# Patient Record
Sex: FEMALE | Race: WHITE | NOT HISPANIC OR LATINO | Employment: FULL TIME | URBAN - METROPOLITAN AREA
[De-identification: names, ages, dates, MRNs, and addresses within clinical notes are randomized per-mention and may not be internally consistent; named-entity substitution may affect disease eponyms.]

---

## 2017-07-27 ENCOUNTER — TRANSCRIBE ORDERS (OUTPATIENT)
Dept: ADMINISTRATIVE | Facility: HOSPITAL | Age: 47
End: 2017-07-27

## 2017-07-27 DIAGNOSIS — Z12.31 VISIT FOR SCREENING MAMMOGRAM: Primary | ICD-10-CM

## 2017-08-24 ENCOUNTER — HOSPITAL ENCOUNTER (OUTPATIENT)
Dept: MAMMOGRAPHY | Facility: HOSPITAL | Age: 47
Discharge: HOME | End: 2017-08-24
Attending: OBSTETRICS & GYNECOLOGY | Admitting: OBSTETRICS & GYNECOLOGY

## 2017-08-24 DIAGNOSIS — Z12.31 VISIT FOR SCREENING MAMMOGRAM: ICD-10-CM

## 2017-08-24 PROCEDURE — 77067 SCR MAMMO BI INCL CAD: CPT | Performed by: RADIOLOGY

## 2017-08-24 PROCEDURE — G0202 SCR MAMMO BI INCL CAD: HCPCS

## 2017-08-24 PROCEDURE — 77063 BREAST TOMOSYNTHESIS BI: CPT

## 2017-08-24 PROCEDURE — 77063 BREAST TOMOSYNTHESIS BI: CPT | Performed by: RADIOLOGY

## 2017-09-01 ENCOUNTER — HOSPITAL ENCOUNTER (OUTPATIENT)
Dept: MAMMOGRAPHY | Facility: HOSPITAL | Age: 47
Discharge: HOME | End: 2017-09-01

## 2017-09-01 ENCOUNTER — HOSPITAL ENCOUNTER (OUTPATIENT)
Dept: ULTRASOUND IMAGING | Facility: HOSPITAL | Age: 47
Discharge: HOME | End: 2017-09-01
Admitting: OBSTETRICS & GYNECOLOGY

## 2017-09-01 DIAGNOSIS — R92.8 ABNORMAL MAMMOGRAM: ICD-10-CM

## 2017-09-01 PROCEDURE — G0202 SCR MAMMO BI INCL CAD: HCPCS

## 2017-09-01 PROCEDURE — 76642 ULTRASOUND BREAST LIMITED: CPT | Performed by: RADIOLOGY

## 2017-09-01 PROCEDURE — 76642 ULTRASOUND BREAST LIMITED: CPT

## 2018-08-15 ENCOUNTER — TRANSCRIBE ORDERS (OUTPATIENT)
Dept: ADMINISTRATIVE | Facility: HOSPITAL | Age: 48
End: 2018-08-15

## 2018-08-15 DIAGNOSIS — Z12.31 VISIT FOR SCREENING MAMMOGRAM: Primary | ICD-10-CM

## 2018-08-30 ENCOUNTER — HOSPITAL ENCOUNTER (OUTPATIENT)
Dept: MAMMOGRAPHY | Facility: HOSPITAL | Age: 48
Discharge: HOME | End: 2018-08-30
Attending: OBSTETRICS & GYNECOLOGY | Admitting: OBSTETRICS & GYNECOLOGY

## 2018-08-30 DIAGNOSIS — Z12.31 VISIT FOR SCREENING MAMMOGRAM: ICD-10-CM

## 2018-08-30 PROCEDURE — 77067 SCR MAMMO BI INCL CAD: CPT

## 2018-08-30 PROCEDURE — 77067 SCR MAMMO BI INCL CAD: CPT | Performed by: RADIOLOGY

## 2018-08-30 PROCEDURE — 77063 BREAST TOMOSYNTHESIS BI: CPT | Performed by: RADIOLOGY

## 2018-08-30 PROCEDURE — 77063 BREAST TOMOSYNTHESIS BI: CPT

## 2019-07-26 ENCOUNTER — TRANSCRIBE ORDERS (OUTPATIENT)
Dept: ADMINISTRATIVE | Facility: HOSPITAL | Age: 49
End: 2019-07-26

## 2019-07-26 DIAGNOSIS — N63.14 BREAST LUMP ON RIGHT SIDE AT 4 O'CLOCK POSITION: Primary | ICD-10-CM

## 2019-09-03 ENCOUNTER — HOSPITAL ENCOUNTER (OUTPATIENT)
Dept: MAMMOGRAPHY | Facility: HOSPITAL | Age: 49
Discharge: HOME | End: 2019-09-03
Admitting: ADVANCED PRACTICE MIDWIFE

## 2019-09-03 ENCOUNTER — HOSPITAL ENCOUNTER (OUTPATIENT)
Dept: ULTRASOUND IMAGING | Facility: HOSPITAL | Age: 49
Discharge: HOME | End: 2019-09-03

## 2019-09-03 DIAGNOSIS — N63.14 BREAST LUMP ON RIGHT SIDE AT 4 O'CLOCK POSITION: ICD-10-CM

## 2019-09-03 PROCEDURE — G0279 TOMOSYNTHESIS, MAMMO: HCPCS

## 2019-09-03 PROCEDURE — 76642 ULTRASOUND BREAST LIMITED: CPT | Performed by: RADIOLOGY

## 2019-09-03 PROCEDURE — 76642 ULTRASOUND BREAST LIMITED: CPT

## 2019-09-03 PROCEDURE — 77062 BREAST TOMOSYNTHESIS BI: CPT | Performed by: RADIOLOGY

## 2019-09-03 PROCEDURE — 77066 DX MAMMO INCL CAD BI: CPT

## 2019-09-03 PROCEDURE — 77066 DX MAMMO INCL CAD BI: CPT | Performed by: RADIOLOGY

## 2020-07-27 ENCOUNTER — TRANSCRIBE ORDERS (OUTPATIENT)
Dept: ADMINISTRATIVE | Facility: HOSPITAL | Age: 50
End: 2020-07-27

## 2020-07-27 DIAGNOSIS — Z12.31 VISIT FOR SCREENING MAMMOGRAM: Primary | ICD-10-CM

## 2020-11-11 ENCOUNTER — HOSPITAL ENCOUNTER (OUTPATIENT)
Dept: MAMMOGRAPHY | Facility: HOSPITAL | Age: 50
Discharge: HOME | End: 2020-11-11
Admitting: ADVANCED PRACTICE MIDWIFE

## 2020-11-11 DIAGNOSIS — Z12.31 VISIT FOR SCREENING MAMMOGRAM: ICD-10-CM

## 2020-11-11 PROCEDURE — 77063 BREAST TOMOSYNTHESIS BI: CPT | Performed by: RADIOLOGY

## 2020-11-11 PROCEDURE — 77067 SCR MAMMO BI INCL CAD: CPT | Performed by: RADIOLOGY

## 2020-11-11 PROCEDURE — 77063 BREAST TOMOSYNTHESIS BI: CPT

## 2020-11-11 PROCEDURE — 77067 SCR MAMMO BI INCL CAD: CPT

## 2020-11-24 ENCOUNTER — OFFICE VISIT (OUTPATIENT)
Dept: ENDOCRINOLOGY | Facility: CLINIC | Age: 50
End: 2020-11-24

## 2020-11-24 ENCOUNTER — LAB REQUISITION (OUTPATIENT)
Dept: LAB | Facility: HOSPITAL | Age: 50
End: 2020-11-24

## 2020-11-24 VITALS
TEMPERATURE: 97.3 F | HEART RATE: 70 BPM | HEIGHT: 68 IN | SYSTOLIC BLOOD PRESSURE: 116 MMHG | WEIGHT: 145.8 LBS | DIASTOLIC BLOOD PRESSURE: 60 MMHG | OXYGEN SATURATION: 98 % | BODY MASS INDEX: 22.1 KG/M2

## 2020-11-24 DIAGNOSIS — E89.0 POSTSURGICAL HYPOTHYROIDISM: Primary | ICD-10-CM

## 2020-11-24 DIAGNOSIS — Z00.00 ROUTINE GENERAL MEDICAL EXAMINATION AT A HEALTH CARE FACILITY: ICD-10-CM

## 2020-11-24 DIAGNOSIS — C73 PAPILLARY THYROID CARCINOMA (HCC): ICD-10-CM

## 2020-11-24 PROCEDURE — 36415 COLL VENOUS BLD VENIPUNCTURE: CPT | Performed by: INTERNAL MEDICINE

## 2020-11-24 PROCEDURE — 99213 OFFICE O/P EST LOW 20 MIN: CPT | Performed by: INTERNAL MEDICINE

## 2020-11-24 RX ORDER — LEVOTHYROXINE SODIUM 137 MCG
TABLET ORAL
COMMUNITY
Start: 2020-10-22 | End: 2020-11-24 | Stop reason: SDUPTHER

## 2020-11-24 RX ORDER — FLUOCINOLONE ACETONIDE 0.11 MG/ML
OIL TOPICAL
COMMUNITY

## 2020-11-24 RX ORDER — PHENOL 1.4 %
AEROSOL, SPRAY (ML) MUCOUS MEMBRANE
COMMUNITY

## 2020-11-24 RX ORDER — ALUMINUM CHLORIDE 20 %
SOLUTION, NON-ORAL TOPICAL
COMMUNITY
Start: 2020-08-19 | End: 2021-11-09

## 2020-11-24 RX ORDER — FLUTICASONE PROPIONATE 50 MCG
SPRAY, SUSPENSION (ML) NASAL
COMMUNITY

## 2020-11-24 RX ORDER — FLUCONAZOLE 150 MG/1
TABLET ORAL
COMMUNITY
End: 2021-11-09

## 2020-11-24 RX ORDER — NITROFURANTOIN 25; 75 MG/1; MG/1
CAPSULE ORAL
COMMUNITY

## 2020-11-24 RX ORDER — LEVOTHYROXINE SODIUM 137 MCG
137 TABLET ORAL DAILY
Qty: 90 TABLET | Refills: 3 | Status: SHIPPED | OUTPATIENT
Start: 2020-11-24 | End: 2021-11-09 | Stop reason: SDUPTHER

## 2020-11-24 RX ORDER — CLOBETASOL PROPIONATE 0.46 MG/ML
SOLUTION TOPICAL
COMMUNITY
Start: 2016-09-12

## 2020-11-24 RX ORDER — ETHINYL ESTRADIOL/DROSPIRENONE 0.02-3(28)
TABLET ORAL
COMMUNITY
Start: 2020-09-26 | End: 2022-11-08 | Stop reason: SDUPTHER

## 2020-11-24 RX ORDER — FLUOCINOLONE ACETONIDE 0.11 MG/ML
OIL TOPICAL
COMMUNITY
End: 2021-11-09

## 2020-11-24 RX ORDER — DIPHENOXYLATE HYDROCHLORIDE AND ATROPINE SULFATE 2.5; .025 MG/1; MG/1
TABLET ORAL
COMMUNITY

## 2020-11-24 NOTE — PROGRESS NOTES
Office Note      Date: 2020  Patient Name: Alicia Walton  MRN: [id number]  : 1970    Chief Complaint   Patient presents with   • Thyroid Problem       History of Present Illness:   Alicia Walton is a 50 y.o. female who presents for Thyroid Problem    She remains on the synthroid 137mcg qd. She is taking this correctly. She isn't taking any  interfering meds concurrently. She hasn't noted any change in the size of her neck. She  denies any compressive sxs. She denies any sxs of hypo- or hyperthyroidism at this time.     Papillary thyroid cancer (+ lymph nodes and some esophageal extension)  Total thyroidectomy   100mci I-131 remnant ablation   Lymph node removal / recurrence   150 mci I-131 in    Whole body scan , Thyrogen induced, Negative for metastatic dz.   Whole body scan and TG after thyrogen normal    Whole body scan showed uptake in neck    100mCi I131 3/06 - neck uptake on post-treatment scan; negative TG and thyrogen  stimulated scan 10/06; thyrogen stimulated TG of 1.1 in 10/07; negative thyrogen scan and  TG 10/08; negative scan and TG of 0.7 in 12/10; negative scan and TG 1.0 in ; negative  neck u/s ; negative neck u/s and unstimulated TG ; negative neck u/s and  unstimulated TG 5/15; negative thyrogen stimulated TG ; negative neck u/s ;  negative unstimulated TG and neck u/s ; negative neck u/s and unstimulated TG 2019    Subjective      Review of Systems:   Review of Systems   Constitutional: Negative.    Cardiovascular: Negative.    Gastrointestinal: Negative.    Endocrine: Negative.        The following portions of the patient's history were reviewed and updated as appropriate: allergies, current medications, past family history, past medical history, past social history, past surgical history and problem list.    Objective     Visit Vitals  /60 (BP Location: Left arm, Patient Position: Sitting, Cuff  "Size: Small Adult)   Pulse 70   Temp 97.3 °F (36.3 °C) (Infrared)   Ht 172.7 cm (68\")   Wt 66.1 kg (145 lb 12.8 oz)   SpO2 98%   BMI 22.17 kg/m²       Physical Exam:  Physical Exam  Constitutional:       Appearance: Normal appearance.   Neck:      Comments: No palpable thyroid tissue or masses  Lymphadenopathy:      Cervical: No cervical adenopathy.   Neurological:      Mental Status: She is alert.         Labs:    TSH  No results found for: TSHBASE     Free T4  No results found for: FREET4    T3  No results found for: F2WOGSD      TPO  No results found for: THYROIDAB    TG AB  No results found for: THGAB    TG  No results found for: THYROGLB    CBC w/DIFF  Lab Results   Component Value Date    WBC 6.81 01/31/2014    RBC 4.85 01/31/2014    HGB 14.4 01/31/2014    HCT 42.3 01/31/2014    MCV 87.2 01/31/2014    MCH 29.7 01/31/2014    MCHC 34.0 01/31/2014     01/31/2014           Assessment / Plan      Assessment & Plan:  Problem List Items Addressed This Visit        Endocrine    Papillary thyroid carcinoma (CMS/HCC)    Current Assessment & Plan     Plan for neck u/s and TG in 6 months.         Relevant Medications    Synthroid 137 MCG tablet    Postsurgical hypothyroidism - Primary    Current Assessment & Plan     Check TFTs today.         Relevant Medications    Synthroid 137 MCG tablet    Other Relevant Orders    TSH    T4, Free           Return in about 6 months (around 5/24/2021) for Recheck with TSH, free T4, TG, TG ab; neck u/s.    Abelino Kelley MD   11/24/2020  "

## 2020-11-25 LAB
T4 FREE SERPL-MCNC: 1.85 NG/DL (ref 0.93–1.7)
TSH SERPL DL<=0.005 MIU/L-ACNC: 0.02 UIU/ML (ref 0.27–4.2)

## 2021-01-13 ENCOUNTER — TRANSCRIBE ORDERS (OUTPATIENT)
Dept: MAMMOGRAPHY | Facility: HOSPITAL | Age: 51
End: 2021-01-13

## 2021-01-18 ENCOUNTER — TRANSCRIBE ORDERS (OUTPATIENT)
Dept: MAMMOGRAPHY | Facility: HOSPITAL | Age: 51
End: 2021-01-18

## 2021-01-18 DIAGNOSIS — R92.8 ABNORMAL MAMMOGRAM: Primary | ICD-10-CM

## 2021-02-11 ENCOUNTER — TRANSCRIBE ORDERS (OUTPATIENT)
Dept: MAMMOGRAPHY | Facility: HOSPITAL | Age: 51
End: 2021-02-11

## 2021-02-11 ENCOUNTER — HOSPITAL ENCOUNTER (OUTPATIENT)
Dept: ULTRASOUND IMAGING | Facility: HOSPITAL | Age: 51
Discharge: HOME | End: 2021-02-11

## 2021-02-11 DIAGNOSIS — R92.8 ABNORMAL MAMMOGRAM: ICD-10-CM

## 2021-02-11 DIAGNOSIS — R92.8 ABNORMAL MAMMOGRAM: Primary | ICD-10-CM

## 2021-02-11 PROCEDURE — 25010000003 LIDOCAINE 1 % SOLUTION: Performed by: RADIOLOGY

## 2021-02-11 PROCEDURE — 76642 ULTRASOUND BREAST LIMITED: CPT | Performed by: RADIOLOGY

## 2021-02-11 PROCEDURE — 76942 ECHO GUIDE FOR BIOPSY: CPT | Performed by: RADIOLOGY

## 2021-02-11 PROCEDURE — 76642 ULTRASOUND BREAST LIMITED: CPT

## 2021-02-11 PROCEDURE — 76942 ECHO GUIDE FOR BIOPSY: CPT

## 2021-02-11 PROCEDURE — 19000 PUNCTURE ASPIR CYST BREAST: CPT | Performed by: RADIOLOGY

## 2021-02-11 RX ORDER — LIDOCAINE HYDROCHLORIDE 10 MG/ML
5 INJECTION, SOLUTION INFILTRATION; PERINEURAL ONCE
Status: COMPLETED | OUTPATIENT
Start: 2021-02-11 | End: 2021-02-11

## 2021-02-11 RX ADMIN — LIDOCAINE HYDROCHLORIDE 5 ML: 10 INJECTION, SOLUTION INFILTRATION; PERINEURAL at 13:46

## 2021-03-12 ENCOUNTER — APPOINTMENT (OUTPATIENT)
Dept: ULTRASOUND IMAGING | Facility: HOSPITAL | Age: 51
End: 2021-03-12

## 2021-09-28 ENCOUNTER — TRANSCRIBE ORDERS (OUTPATIENT)
Dept: ADMINISTRATIVE | Facility: HOSPITAL | Age: 51
End: 2021-09-28

## 2021-09-28 DIAGNOSIS — Z12.31 VISIT FOR SCREENING MAMMOGRAM: Primary | ICD-10-CM

## 2021-11-09 ENCOUNTER — OFFICE VISIT (OUTPATIENT)
Dept: ENDOCRINOLOGY | Facility: CLINIC | Age: 51
End: 2021-11-09

## 2021-11-09 ENCOUNTER — LAB (OUTPATIENT)
Dept: LAB | Facility: HOSPITAL | Age: 51
End: 2021-11-09

## 2021-11-09 VITALS
BODY MASS INDEX: 22.13 KG/M2 | HEIGHT: 68 IN | DIASTOLIC BLOOD PRESSURE: 64 MMHG | SYSTOLIC BLOOD PRESSURE: 130 MMHG | HEART RATE: 86 BPM | WEIGHT: 146 LBS | OXYGEN SATURATION: 99 % | RESPIRATION RATE: 12 BRPM

## 2021-11-09 DIAGNOSIS — C73 PAPILLARY THYROID CARCINOMA (HCC): ICD-10-CM

## 2021-11-09 DIAGNOSIS — E89.0 POSTSURGICAL HYPOTHYROIDISM: ICD-10-CM

## 2021-11-09 DIAGNOSIS — E89.0 POSTSURGICAL HYPOTHYROIDISM: Primary | ICD-10-CM

## 2021-11-09 PROCEDURE — 99213 OFFICE O/P EST LOW 20 MIN: CPT | Performed by: INTERNAL MEDICINE

## 2021-11-09 PROCEDURE — 86800 THYROGLOBULIN ANTIBODY: CPT

## 2021-11-09 PROCEDURE — 84432 ASSAY OF THYROGLOBULIN: CPT

## 2021-11-09 PROCEDURE — 76536 US EXAM OF HEAD AND NECK: CPT | Performed by: INTERNAL MEDICINE

## 2021-11-09 RX ORDER — LEVOTHYROXINE SODIUM 137 MCG
137 TABLET ORAL DAILY
Qty: 90 TABLET | Refills: 3 | Status: SHIPPED | OUTPATIENT
Start: 2021-11-09 | End: 2022-11-08 | Stop reason: SDUPTHER

## 2021-11-09 RX ORDER — LORATADINE 10 MG/1
TABLET ORAL
COMMUNITY

## 2021-11-09 NOTE — PROGRESS NOTES
"     Office Note      Date: 2021  Patient Name: Alicia Walton  MRN: [id number]  : 1970    Chief Complaint   Patient presents with   • Hypothyroidism       History of Present Illness:   Alicia Walton is a 51 y.o. female who presents for Hypothyroidism    She remains on the synthroid 137mcg qd. She is taking this correctly. She isn't taking any interfering meds concurrently. She hasn't noted any change in the size of her neck. She denies any compressive sxs. She denies any sxs of hypo- or hyperthyroidism at this time.      Papillary thyroid cancer (+ lymph nodes and some esophageal extension)  Total thyroidectomy   100mci I-131 remnant ablation   Lymph node removal / recurrence   150 mci I-131 in    Whole body scan , Thyrogen induced, Negative for metastatic dz.   Whole body scan and TG after thyrogen normal    Whole body scan showed uptake in neck    100mCi I131 3/06 - neck uptake on post-treatment scan; negative TG and thyrogen  stimulated scan 10/06; thyrogen stimulated TG of 1.1 in 10/07; negative thyrogen scan and  TG 10/08; negative scan and TG of 0.7 in 12/10; negative scan and TG 1.0 in ; negative  neck u/s ; negative neck u/s and unstimulated TG ; negative neck u/s and  unstimulated TG 5/15; negative thyrogen stimulated TG ; negative neck u/s ;  negative unstimulated TG and neck u/s ; negative neck u/s and unstimulated TG 2019    Subjective      Review of Systems:   Review of Systems   Constitutional: Negative.    Cardiovascular: Negative.    Gastrointestinal: Negative.    Endocrine: Negative.        The following portions of the patient's history were reviewed and updated as appropriate: allergies, current medications, past family history, past medical history, past social history, past surgical history and problem list.    Objective     Visit Vitals  /64   Pulse 86   Resp 12   Ht 172.7 cm (68\")   Wt 66.2 kg (146 " lb)   SpO2 99%   BMI 22.20 kg/m²       Physical Exam:  Physical Exam  Constitutional:       Appearance: Normal appearance.   Neurological:      Mental Status: She is alert.         Labs:    TSH  No results found for: TSHBASE     Free T4  Free T4   Date Value Ref Range Status   11/24/2020 1.85 (H) 0.93 - 1.70 ng/dL Final     Comment:     Results may be falsely increased if patient taking Biotin.       T3  No results found for: Q3HPVNY      TPO  No results found for: THYROIDAB    TG AB  No results found for: THGAB    TG  No results found for: THYROGLB    CBC w/DIFF  Lab Results   Component Value Date    WBC 6.81 01/31/2014    RBC 4.85 01/31/2014    HGB 14.4 01/31/2014    HCT 42.3 01/31/2014    MCV 87.2 01/31/2014    MCH 29.7 01/31/2014    MCHC 34.0 01/31/2014     01/31/2014           Assessment / Plan      Assessment & Plan:  Diagnoses and all orders for this visit:    1. Postsurgical hypothyroidism (Primary)  Assessment & Plan:  Continue synthroid.  Check TFTs today.    Orders:  -     TSH; Future  -     T4, Free; Future    2. Papillary thyroid carcinoma (HCC)  Assessment & Plan:  Check TG today.    A neck u/s was performed today.  This revealed clear thyroid bed with no masses.  No abnormal lymph nodes were seen.    Orders:  -     Thyroglobulin + Thyroglobulin Antibody ([place]); Future  -     US Thyroid    Other orders  -     Synthroid 137 MCG tablet; Take 1 tablet by mouth Daily.  Dispense: 90 tablet; Refill: 3      Return in about 1 year (around 11/9/2022) for Recheck with TSH, free T4.    Abelino Kelley MD   11/09/2021

## 2021-11-09 NOTE — ASSESSMENT & PLAN NOTE
Check TG today.    A neck u/s was performed today.  This revealed clear thyroid bed with no masses.  No abnormal lymph nodes were seen.

## 2021-11-10 LAB
T4 FREE SERPL-MCNC: 1.96 NG/DL (ref 0.82–1.77)
TSH SERPL DL<=0.005 MIU/L-ACNC: 0.02 UIU/ML (ref 0.45–4.5)

## 2021-11-11 LAB — REF LAB TEST METHOD: NORMAL

## 2021-11-12 ENCOUNTER — HOSPITAL ENCOUNTER (OUTPATIENT)
Dept: MAMMOGRAPHY | Facility: HOSPITAL | Age: 51
Discharge: HOME | End: 2021-11-12

## 2021-11-12 DIAGNOSIS — Z12.31 VISIT FOR SCREENING MAMMOGRAM: ICD-10-CM

## 2021-12-27 ENCOUNTER — TRANSCRIBE ORDERS (OUTPATIENT)
Dept: LAB | Facility: HOSPITAL | Age: 51
End: 2021-12-27

## 2021-12-27 DIAGNOSIS — N95.1 SYMPTOMATIC MENOPAUSAL OR FEMALE CLIMACTERIC STATES: Primary | ICD-10-CM

## 2021-12-29 ENCOUNTER — HOSPITAL ENCOUNTER (OUTPATIENT)
Dept: MAMMOGRAPHY | Facility: HOSPITAL | Age: 51
Discharge: HOME | End: 2021-12-29
Admitting: ADVANCED PRACTICE MIDWIFE

## 2021-12-29 PROCEDURE — 77067 SCR MAMMO BI INCL CAD: CPT

## 2021-12-29 PROCEDURE — 77063 BREAST TOMOSYNTHESIS BI: CPT

## 2021-12-29 PROCEDURE — 77067 SCR MAMMO BI INCL CAD: CPT | Performed by: RADIOLOGY

## 2021-12-29 PROCEDURE — 77063 BREAST TOMOSYNTHESIS BI: CPT | Performed by: RADIOLOGY

## 2022-02-03 ENCOUNTER — APPOINTMENT (OUTPATIENT)
Dept: MAMMOGRAPHY | Facility: HOSPITAL | Age: 52
End: 2022-02-03

## 2022-03-03 ENCOUNTER — HOSPITAL ENCOUNTER (OUTPATIENT)
Dept: ULTRASOUND IMAGING | Facility: HOSPITAL | Age: 52
Discharge: HOME | End: 2022-03-03

## 2022-03-03 ENCOUNTER — HOSPITAL ENCOUNTER (OUTPATIENT)
Dept: MAMMOGRAPHY | Facility: HOSPITAL | Age: 52
Discharge: HOME | End: 2022-03-03

## 2022-03-03 ENCOUNTER — TRANSCRIBE ORDERS (OUTPATIENT)
Dept: MAMMOGRAPHY | Facility: HOSPITAL | Age: 52
End: 2022-03-03

## 2022-03-03 DIAGNOSIS — R92.8 ABNORMAL MAMMOGRAM: ICD-10-CM

## 2022-03-03 DIAGNOSIS — R92.8 ABNORMAL MAMMOGRAM: Primary | ICD-10-CM

## 2022-03-03 PROCEDURE — 77062 BREAST TOMOSYNTHESIS BI: CPT | Performed by: RADIOLOGY

## 2022-03-03 PROCEDURE — 76642 ULTRASOUND BREAST LIMITED: CPT | Performed by: RADIOLOGY

## 2022-03-03 PROCEDURE — G0279 TOMOSYNTHESIS, MAMMO: HCPCS

## 2022-03-03 PROCEDURE — 77066 DX MAMMO INCL CAD BI: CPT | Performed by: RADIOLOGY

## 2022-03-03 PROCEDURE — 77066 DX MAMMO INCL CAD BI: CPT

## 2022-03-03 PROCEDURE — 76642 ULTRASOUND BREAST LIMITED: CPT

## 2022-03-23 ENCOUNTER — APPOINTMENT (OUTPATIENT)
Dept: MAMMOGRAPHY | Facility: HOSPITAL | Age: 52
End: 2022-03-23

## 2022-03-31 ENCOUNTER — HOSPITAL ENCOUNTER (OUTPATIENT)
Dept: MAMMOGRAPHY | Facility: HOSPITAL | Age: 52
Discharge: HOME | End: 2022-03-31

## 2022-03-31 DIAGNOSIS — R92.8 ABNORMAL MAMMOGRAM: ICD-10-CM

## 2022-03-31 PROCEDURE — A4648 IMPLANTABLE TISSUE MARKER: HCPCS

## 2022-03-31 PROCEDURE — 0 LIDOCAINE 1 % SOLUTION: Performed by: ADVANCED PRACTICE MIDWIFE

## 2022-03-31 PROCEDURE — 88305 TISSUE EXAM BY PATHOLOGIST: CPT | Performed by: ADVANCED PRACTICE MIDWIFE

## 2022-03-31 PROCEDURE — 77065 DX MAMMO INCL CAD UNI: CPT | Performed by: RADIOLOGY

## 2022-03-31 PROCEDURE — 19081 BX BREAST 1ST LESION STRTCTC: CPT | Performed by: RADIOLOGY

## 2022-03-31 RX ORDER — LIDOCAINE HYDROCHLORIDE 10 MG/ML
5 INJECTION, SOLUTION INFILTRATION; PERINEURAL ONCE
Status: COMPLETED | OUTPATIENT
Start: 2022-03-31 | End: 2022-03-31

## 2022-03-31 RX ORDER — LIDOCAINE HYDROCHLORIDE AND EPINEPHRINE 10; 10 MG/ML; UG/ML
20 INJECTION, SOLUTION INFILTRATION; PERINEURAL ONCE
Status: COMPLETED | OUTPATIENT
Start: 2022-03-31 | End: 2022-03-31

## 2022-03-31 RX ADMIN — Medication 5 ML: at 10:02

## 2022-03-31 RX ADMIN — LIDOCAINE HYDROCHLORIDE AND EPINEPHRINE 13 ML: 10; 10 INJECTION, SOLUTION INFILTRATION; PERINEURAL at 10:11

## 2022-04-01 ENCOUNTER — TELEPHONE (OUTPATIENT)
Dept: MAMMOGRAPHY | Facility: HOSPITAL | Age: 52
End: 2022-04-01

## 2022-04-01 LAB
CYTO UR: NORMAL
LAB AP CASE REPORT: NORMAL
LAB AP CLINICAL INFORMATION: NORMAL
LAB AP DIAGNOSIS COMMENT: NORMAL
PATH REPORT.FINAL DX SPEC: NORMAL
PATH REPORT.GROSS SPEC: NORMAL

## 2022-04-01 NOTE — TELEPHONE ENCOUNTER
Patient notified of pathology results and recommendations. Verbalizes understanding. Denies discomfort. Denies signs and symptoms of infection.     Patient desires Dr CELE Cantu for surgical consult. Patient will be notified of appointment. Pt verbalized understanding.

## 2022-04-01 NOTE — TELEPHONE ENCOUNTER
Attempted to notify patient of results and recommendation.  Left message on patient's voicemail to return my call.

## 2022-04-04 ENCOUNTER — TELEPHONE (OUTPATIENT)
Dept: MAMMOGRAPHY | Facility: HOSPITAL | Age: 52
End: 2022-04-04

## 2022-04-04 NOTE — TELEPHONE ENCOUNTER
Patient notified of surgical consult appointment with Dr. Cantu on 4.28.22 @ 5042. Patient given office contact & location information. Told to bring photo ID, list of prescription & OTC medications, insurance information, must wear a mask & come to visit alone unless assistance is needed. Encouraged patient to call back or contact surgeon's office with further questions. Patient verbalized understanding.

## 2022-05-02 ENCOUNTER — TRANSCRIBE ORDERS (OUTPATIENT)
Dept: ADMINISTRATIVE | Facility: HOSPITAL | Age: 52
End: 2022-05-02

## 2022-05-02 ENCOUNTER — TRANSCRIBE ORDERS (OUTPATIENT)
Dept: MAMMOGRAPHY | Facility: HOSPITAL | Age: 52
End: 2022-05-02

## 2022-05-02 DIAGNOSIS — R92.8 ABNORMAL MAMMOGRAM: Primary | ICD-10-CM

## 2022-05-02 DIAGNOSIS — Z11.59 SPECIAL SCREENING EXAMINATION FOR VIRAL DISEASE: Primary | ICD-10-CM

## 2022-05-17 ENCOUNTER — LAB (OUTPATIENT)
Dept: PREADMISSION TESTING | Facility: HOSPITAL | Age: 52
End: 2022-05-17

## 2022-05-17 DIAGNOSIS — Z11.59 SPECIAL SCREENING EXAMINATION FOR VIRAL DISEASE: ICD-10-CM

## 2022-05-17 LAB — SARS-COV-2 RNA PNL SPEC NAA+PROBE: NOT DETECTED

## 2022-05-17 PROCEDURE — U0004 COV-19 TEST NON-CDC HGH THRU: HCPCS

## 2022-05-17 PROCEDURE — C9803 HOPD COVID-19 SPEC COLLECT: HCPCS

## 2022-05-18 ENCOUNTER — TRANSCRIBE ORDERS (OUTPATIENT)
Dept: ADMINISTRATIVE | Facility: HOSPITAL | Age: 52
End: 2022-05-18

## 2022-05-18 DIAGNOSIS — Z11.59 ENCOUNTER FOR SCREENING FOR VIRAL DISEASE: Primary | ICD-10-CM

## 2022-05-20 ENCOUNTER — APPOINTMENT (OUTPATIENT)
Dept: MAMMOGRAPHY | Facility: HOSPITAL | Age: 52
End: 2022-05-20

## 2022-06-07 ENCOUNTER — LAB (OUTPATIENT)
Dept: PREADMISSION TESTING | Facility: HOSPITAL | Age: 52
End: 2022-06-07

## 2022-06-07 DIAGNOSIS — Z11.59 ENCOUNTER FOR SCREENING FOR VIRAL DISEASE: ICD-10-CM

## 2022-06-07 LAB — SARS-COV-2 RNA PNL SPEC NAA+PROBE: NOT DETECTED

## 2022-06-07 PROCEDURE — U0004 COV-19 TEST NON-CDC HGH THRU: HCPCS

## 2022-06-07 PROCEDURE — C9803 HOPD COVID-19 SPEC COLLECT: HCPCS

## 2022-06-10 ENCOUNTER — HOSPITAL ENCOUNTER (OUTPATIENT)
Dept: MAMMOGRAPHY | Facility: HOSPITAL | Age: 52
Discharge: HOME | End: 2022-06-10

## 2022-06-10 ENCOUNTER — LAB REQUISITION (OUTPATIENT)
Dept: LAB | Facility: HOSPITAL | Age: 52
End: 2022-06-10

## 2022-06-10 DIAGNOSIS — R92.8 ABNORMAL MAMMOGRAM: ICD-10-CM

## 2022-06-10 DIAGNOSIS — R92.8 OTHER ABNORMAL AND INCONCLUSIVE FINDINGS ON DIAGNOSTIC IMAGING OF BREAST: ICD-10-CM

## 2022-06-10 PROCEDURE — 19281 PERQ DEVICE BREAST 1ST IMAG: CPT | Performed by: RADIOLOGY

## 2022-06-10 PROCEDURE — 76098 X-RAY EXAM SURGICAL SPECIMEN: CPT

## 2022-06-10 PROCEDURE — C1819 TISSUE LOCALIZATION-EXCISION: HCPCS

## 2022-06-10 PROCEDURE — 0 LIDOCAINE 1 % SOLUTION: Performed by: RADIOLOGY

## 2022-06-10 PROCEDURE — 76098 X-RAY EXAM SURGICAL SPECIMEN: CPT | Performed by: RADIOLOGY

## 2022-06-10 PROCEDURE — 88305 TISSUE EXAM BY PATHOLOGIST: CPT | Performed by: SURGERY

## 2022-06-10 RX ORDER — LIDOCAINE HYDROCHLORIDE 10 MG/ML
5 INJECTION, SOLUTION INFILTRATION; PERINEURAL ONCE
Status: COMPLETED | OUTPATIENT
Start: 2022-06-10 | End: 2022-06-10

## 2022-06-10 RX ADMIN — Medication 5 ML: at 14:05

## 2022-06-13 LAB
CYTO UR: NORMAL
LAB AP CASE REPORT: NORMAL
LAB AP CLINICAL INFORMATION: NORMAL
PATH REPORT.FINAL DX SPEC: NORMAL
PATH REPORT.GROSS SPEC: NORMAL

## 2022-06-17 ENCOUNTER — TRANSCRIBE ORDERS (OUTPATIENT)
Dept: MAMMOGRAPHY | Facility: HOSPITAL | Age: 52
End: 2022-06-17

## 2022-06-17 DIAGNOSIS — R92.8 ABNORMAL MAMMOGRAM: Primary | ICD-10-CM

## 2022-11-08 ENCOUNTER — LAB (OUTPATIENT)
Dept: LAB | Facility: HOSPITAL | Age: 52
End: 2022-11-08

## 2022-11-08 ENCOUNTER — OFFICE VISIT (OUTPATIENT)
Dept: ENDOCRINOLOGY | Facility: CLINIC | Age: 52
End: 2022-11-08

## 2022-11-08 VITALS
SYSTOLIC BLOOD PRESSURE: 112 MMHG | OXYGEN SATURATION: 98 % | BODY MASS INDEX: 20.92 KG/M2 | HEIGHT: 68 IN | DIASTOLIC BLOOD PRESSURE: 62 MMHG | HEART RATE: 81 BPM | WEIGHT: 138 LBS

## 2022-11-08 DIAGNOSIS — C73 PAPILLARY THYROID CARCINOMA: ICD-10-CM

## 2022-11-08 DIAGNOSIS — E89.0 POSTSURGICAL HYPOTHYROIDISM: ICD-10-CM

## 2022-11-08 DIAGNOSIS — E89.0 POSTSURGICAL HYPOTHYROIDISM: Primary | ICD-10-CM

## 2022-11-08 PROCEDURE — 99213 OFFICE O/P EST LOW 20 MIN: CPT | Performed by: INTERNAL MEDICINE

## 2022-11-08 RX ORDER — LEVOTHYROXINE SODIUM 137 MCG
137 TABLET ORAL DAILY
Qty: 90 TABLET | Refills: 3 | Status: SHIPPED | OUTPATIENT
Start: 2022-11-08

## 2022-11-08 RX ORDER — FLUOCINOLONE ACETONIDE 0.11 MG/ML
OIL AURICULAR (OTIC)
COMMUNITY

## 2022-11-08 RX ORDER — OMEGA-3S/DHA/EPA/FISH OIL 1000-1400
CAPSULE,DELAYED RELEASE (ENTERIC COATED) ORAL
COMMUNITY
Start: 2021-11-25

## 2022-11-08 RX ORDER — KETOCONAZOLE 20 MG/G
CREAM TOPICAL
COMMUNITY
Start: 2022-08-30

## 2022-11-08 RX ORDER — EFINACONAZOLE 100 MG/ML
SOLUTION TOPICAL
COMMUNITY

## 2022-11-08 NOTE — PROGRESS NOTES
Office Note      Date: 2022  Patient Name: Alicia Walton  MRN: [id number]  : 1970    Chief Complaint   Patient presents with   • Hypothyroidism       History of Present Illness:   Alicia Walton is a 52 y.o. female who presents for Hypothyroidism    She remains on the synthroid 137mcg qd. She is taking this correctly. She isn't taking any interfering meds concurrently. She hasn't noted any change in the size of her neck. She denies any compressive sxs. She denies any sxs of hypo- or hyperthyroidism at this time.      Papillary thyroid cancer (+ lymph nodes and some esophageal extension)  Total thyroidectomy   100mci I-131 remnant ablation   Lymph node removal / recurrence   150 mci I-131 in    Whole body scan , Thyrogen induced, Negative for metastatic dz.   Whole body scan and TG after thyrogen normal    Whole body scan showed uptake in neck    100mCi I131 3/06 - neck uptake on post-treatment scan; negative TG and thyrogen  stimulated scan 10/06; thyrogen stimulated TG of 1.1 in 10/07; negative thyrogen scan and  TG 10/08; negative scan and TG of 0.7 in 12/10; negative scan and TG 1.0 in ; negative  neck u/s ; negative neck u/s and unstimulated TG ; negative neck u/s and  unstimulated TG 5/15; negative thyrogen stimulated TG ; negative neck u/s ;  negative unstimulated TG and neck u/s ; negative neck u/s and unstimulated TG 2019; negative neck u/s and unstimulated TG of 0.2 in 2021    Subjective      Review of Systems:   Review of Systems   Constitutional: Negative.    Cardiovascular: Negative.    Gastrointestinal: Negative.    Endocrine: Negative.        The following portions of the patient's history were reviewed and updated as appropriate: allergies, current medications, past family history, past medical history, past social history, past surgical history and problem list.    Objective     Visit Vitals  /62  "  Pulse 81   Ht 172.7 cm (68\")   Wt 62.6 kg (138 lb)   SpO2 98%   BMI 20.98 kg/m²       Physical Exam:  Physical Exam  Constitutional:       Appearance: Normal appearance.   Neck:      Comments: No palpable thyroid tissue or neck masses  Lymphadenopathy:      Cervical: No cervical adenopathy.   Neurological:      Mental Status: She is alert.         Labs:    TSH  No results found for: TSHBASE     Free T4  Free T4   Date Value Ref Range Status   11/09/2021 1.96 (H) 0.82 - 1.77 ng/dL Final       T3  No results found for: [id number]      TPO  No results found for: THYROIDAB    TG AB  No results found for: THGAB    TG  No results found for: THYROGLB    CBC w/DIFF  Lab Results   Component Value Date    WBC 6.81 01/31/2014    RBC 4.85 01/31/2014    HGB 14.4 01/31/2014    HCT 42.3 01/31/2014    MCV 87.2 01/31/2014    MCH 29.7 01/31/2014    MCHC 34.0 01/31/2014     01/31/2014           Assessment / Plan      Assessment & Plan:  Diagnoses and all orders for this visit:    1. Postsurgical hypothyroidism (Primary)  Assessment & Plan:  Continue Synthroid.  Check TFTs today.    Orders:  -     TSH; Future  -     T4, Free; Future    2. Papillary thyroid carcinoma (HCC)  Assessment & Plan:  Plan for TG and neck u/s in a year.      Other orders  -     Synthroid 137 MCG tablet; Take 1 tablet by mouth Daily.  Dispense: 90 tablet; Refill: 3      Return in about 1 year (around 11/8/2023) for Recheck with TSH, free T4, TG, TG ab, neck u/s.    Abelino Kelley MD   11/08/2022  "

## 2022-11-09 LAB
T4 FREE SERPL-MCNC: 2.45 NG/DL (ref 0.93–1.7)
TSH SERPL DL<=0.005 MIU/L-ACNC: 0.01 UIU/ML (ref 0.27–4.2)

## 2022-12-20 ENCOUNTER — HOSPITAL ENCOUNTER (OUTPATIENT)
Dept: MAMMOGRAPHY | Facility: HOSPITAL | Age: 52
Discharge: HOME | End: 2022-12-20
Admitting: SURGERY

## 2022-12-20 ENCOUNTER — TRANSCRIBE ORDERS (OUTPATIENT)
Dept: MAMMOGRAPHY | Facility: HOSPITAL | Age: 52
End: 2022-12-20

## 2022-12-20 DIAGNOSIS — R92.8 ABNORMAL MAMMOGRAM: Primary | ICD-10-CM

## 2022-12-20 DIAGNOSIS — R92.8 ABNORMAL MAMMOGRAM: ICD-10-CM

## 2022-12-20 PROCEDURE — 77062 BREAST TOMOSYNTHESIS BI: CPT | Performed by: RADIOLOGY

## 2022-12-20 PROCEDURE — 77066 DX MAMMO INCL CAD BI: CPT | Performed by: RADIOLOGY

## 2022-12-20 PROCEDURE — 77066 DX MAMMO INCL CAD BI: CPT

## 2022-12-20 PROCEDURE — G0279 TOMOSYNTHESIS, MAMMO: HCPCS

## 2023-07-25 ENCOUNTER — HOSPITAL ENCOUNTER (OUTPATIENT)
Dept: MRI IMAGING | Facility: HOSPITAL | Age: 53
Discharge: HOME | End: 2023-07-25
Admitting: NURSE PRACTITIONER
Payer: COMMERCIAL

## 2023-07-25 DIAGNOSIS — Z12.39 BREAST CANCER SCREENING, HIGH RISK PATIENT: ICD-10-CM

## 2023-07-25 PROCEDURE — 0 GADOBENATE DIMEGLUMINE 529 MG/ML SOLUTION: Performed by: NURSE PRACTITIONER

## 2023-07-25 PROCEDURE — 77049 MRI BREAST C-+ W/CAD BI: CPT

## 2023-07-25 PROCEDURE — A9577 INJ MULTIHANCE: HCPCS | Performed by: NURSE PRACTITIONER

## 2023-07-25 RX ADMIN — GADOBENATE DIMEGLUMINE 12 ML: 529 INJECTION, SOLUTION INTRAVENOUS at 10:07

## 2023-11-10 ENCOUNTER — OFFICE VISIT (OUTPATIENT)
Dept: ENDOCRINOLOGY | Facility: CLINIC | Age: 53
End: 2023-11-10
Payer: COMMERCIAL

## 2023-11-10 VITALS
OXYGEN SATURATION: 100 % | BODY MASS INDEX: 20.76 KG/M2 | HEIGHT: 68 IN | HEART RATE: 80 BPM | SYSTOLIC BLOOD PRESSURE: 124 MMHG | DIASTOLIC BLOOD PRESSURE: 68 MMHG | WEIGHT: 137 LBS

## 2023-11-10 DIAGNOSIS — C73 PAPILLARY THYROID CARCINOMA: ICD-10-CM

## 2023-11-10 DIAGNOSIS — E89.0 POSTSURGICAL HYPOTHYROIDISM: Primary | ICD-10-CM

## 2023-11-10 PROCEDURE — 99213 OFFICE O/P EST LOW 20 MIN: CPT | Performed by: INTERNAL MEDICINE

## 2023-11-10 PROCEDURE — 36415 COLL VENOUS BLD VENIPUNCTURE: CPT | Performed by: INTERNAL MEDICINE

## 2023-11-10 RX ORDER — LEVOTHYROXINE SODIUM 137 MCG
137 TABLET ORAL DAILY
Qty: 90 TABLET | Refills: 3 | Status: SHIPPED | OUTPATIENT
Start: 2023-11-10 | End: 2023-11-11 | Stop reason: DRUGHIGH

## 2023-11-10 NOTE — PROGRESS NOTES
Office Note      Date: 11/10/2023  Patient Name: Alicia Walton  MRN: [id number]  : 1970    Chief Complaint   Patient presents with    Hypothyroidism       History of Present Illness:   Alicia Walton is a 53 y.o. female who presents for Hypothyroidism    She remains on the synthroid 137mcg qd. She is taking this correctly. She isn't taking any interfering meds concurrently. She hasn't noted any change in the size of her neck. She denies any compressive sxs. She denies any sxs of hypo- or hyperthyroidism at this time.      Papillary thyroid cancer (+ lymph nodes and some esophageal extension)  Total thyroidectomy   100mci I-131 remnant ablation   Lymph node removal / recurrence   150 mci I-131 in    Whole body scan , Thyrogen induced, Negative for metastatic dz.   Whole body scan and TG after thyrogen normal    Whole body scan showed uptake in neck    100mCi I131 3/06 - neck uptake on post-treatment scan; negative TG and thyrogen  stimulated scan 10/06; thyrogen stimulated TG of 1.1 in 10/07; negative thyrogen scan and  TG 10/08; negative scan and TG of 0.7 in 12/10; negative scan and TG 1.0 in ; negative  neck u/s ; negative neck u/s and unstimulated TG ; negative neck u/s and  unstimulated TG 5/15; negative thyrogen stimulated TG ; negative neck u/s ;  negative unstimulated TG and neck u/s ; negative neck u/s and unstimulated TG 2019; negative neck u/s and unstimulated TG of 0.2 in 2021    Subjective      Review of Systems:   Review of Systems   Constitutional: Negative.    Cardiovascular: Negative.    Gastrointestinal: Negative.    Endocrine: Negative.        The following portions of the patient's history were reviewed and updated as appropriate: allergies, current medications, past family history, past medical history, past social history, past surgical history, and problem list.    Objective     Visit Vitals  /68 (BP  "Location: Left arm, Patient Position: Sitting, Cuff Size: Adult)   Pulse 80   Ht 172.7 cm (68\")   Wt 62.1 kg (137 lb)   LMP 11/02/2016 (Exact Date)   SpO2 100%   BMI 20.83 kg/m²       Physical Exam:  Physical Exam  Constitutional:       Appearance: Normal appearance.   Neck:      Comments: No palpable thyroid tissue or neck masses  Lymphadenopathy:      Cervical: No cervical adenopathy.   Neurological:      Mental Status: She is alert.         Labs:    TSH  No results found for: \"TSHBASE\"     Free T4  Free T4   Date Value Ref Range Status   11/08/2022 2.45 (H) 0.93 - 1.70 ng/dL Final     Comment:     Results may be falsely increased if patient taking Biotin.       T3  No results found for: \"X3KRJCY\"      TPO  No results found for: \"THYROIDAB\"    TG AB  No results found for: \"THGAB\"    TG  No results found for: \"THYROGLB\"    CBC w/DIFF  Lab Results   Component Value Date    WBC 6.81 01/31/2014    RBC 4.85 01/31/2014    HGB 14.4 01/31/2014    HCT 42.3 01/31/2014    MCV 87.2 01/31/2014    MCH 29.7 01/31/2014    MCHC 34.0 01/31/2014     01/31/2014           Assessment / Plan      Assessment & Plan:  Diagnoses and all orders for this visit:    1. Postsurgical hypothyroidism (Primary)  Assessment & Plan:  Continue T4 tx.  Check TFTs.    Orders:  -     TSH; Future  -     T4, Free; Future    2. Papillary thyroid carcinoma  Assessment & Plan:  We had discussed doing neck u/s and TG levels today.  However, we are currently out of network with her insurance.  We will wait until next visit to do these.  Her risk of recurrence is low at this time.      Other orders  -     Synthroid 137 MCG tablet; Take 1 tablet by mouth Daily.  Dispense: 90 tablet; Refill: 3      Current Outpatient Medications   Medication Instructions    calcium carbonate (OS-ALVERTO) 600 MG tablet Calcium 600 mg calcium (1,500 mg) tablet   1 qd    clobetasol (TEMOVATE) 0.05 % external solution clobetasol 0.05 % scalp solution   USE 1 APPLICATION TO THE " AFFECTED AREA(S) ONCE A DAY AS NEEDED    Fiber Adult Gummies 2 g chewable tablet No dose, route, or frequency recorded.    Fluocinolone Acetonide Scalp 0.01 % oil Derma-Smoothe/FS Scalp Oil 0.01 %   APPLY THIN LAYER TO DAMP SCALP BY TOPICAL ROUTE MASSAGE WELL AND COVER. LEAVE ON FOR 4 HOURS OR OVERNIGHT THEN WASH OFF    fluticasone (FLONASE) 50 MCG/ACT nasal spray Flonase Allergy Relief 50 mcg/actuation nasal spray,suspension   Spray 1 spray every day by intranasal route.    loratadine (CLARITIN) 10 MG tablet loratadine 10 mg tablet   Take 1 tablet every day by oral route.    multivitamin (THERAGRAN) tablet tablet Multiple Vitamin capsule   Daily    nitrofurantoin, macrocrystal-monohydrate, (MACROBID) 100 MG capsule Macrobid 100 mg capsule   1 cap Daily    Synthroid 137 mcg, Oral, Daily      Return in about 1 year (around 11/10/2024) for Recheck with TSH, free T4.    Abelino Kelley MD   11/10/2023

## 2023-11-10 NOTE — ASSESSMENT & PLAN NOTE
We had discussed doing neck u/s and TG levels today.  However, we are currently out of network with her insurance.  We will wait until next visit to do these.  Her risk of recurrence is low at this time.

## 2023-11-11 DIAGNOSIS — E89.0 POSTSURGICAL HYPOTHYROIDISM: Primary | ICD-10-CM

## 2023-11-11 LAB
T4 FREE SERPL-MCNC: 2.74 NG/DL (ref 0.93–1.7)
TSH SERPL DL<=0.005 MIU/L-ACNC: <0.005 UIU/ML (ref 0.27–4.2)

## 2023-11-11 RX ORDER — LEVOTHYROXINE SODIUM 125 MCG
125 TABLET ORAL DAILY
Qty: 90 TABLET | Refills: 3 | Status: SHIPPED | OUTPATIENT
Start: 2023-11-11

## 2024-01-25 ENCOUNTER — TELEPHONE (OUTPATIENT)
Dept: ENDOCRINOLOGY | Facility: CLINIC | Age: 54
End: 2024-01-25
Payer: COMMERCIAL

## 2024-01-25 NOTE — TELEPHONE ENCOUNTER
PATIENT WOULD LIKE TO HAVE CURRENT LAB ORDERS SENT TO [facility]. PATIENT HAS AN APPT  APPT TOMORROW AT 8:15, DOES NOT HAVE FAX #

## 2024-01-30 RX ORDER — LEVOTHYROXINE SODIUM 112 MCG
112 TABLET ORAL DAILY
Qty: 90 TABLET | Refills: 3 | Status: SHIPPED | OUTPATIENT
Start: 2024-01-30

## 2024-02-02 ENCOUNTER — TELEPHONE (OUTPATIENT)
Dept: ENDOCRINOLOGY | Facility: CLINIC | Age: 54
End: 2024-02-02
Payer: COMMERCIAL

## 2024-03-01 ENCOUNTER — TRANSCRIBE ORDERS (OUTPATIENT)
Dept: ADMINISTRATIVE | Facility: HOSPITAL | Age: 54
End: 2024-03-01
Payer: COMMERCIAL

## 2024-03-01 DIAGNOSIS — R92.8 ABNORMAL MAMMOGRAM: Primary | ICD-10-CM

## 2024-04-04 ENCOUNTER — LAB (OUTPATIENT)
Dept: ENDOCRINOLOGY | Facility: CLINIC | Age: 54
End: 2024-04-04
Payer: COMMERCIAL

## 2024-04-04 DIAGNOSIS — E89.0 POSTSURGICAL HYPOTHYROIDISM: ICD-10-CM

## 2024-04-04 PROCEDURE — 84443 ASSAY THYROID STIM HORMONE: CPT | Performed by: INTERNAL MEDICINE

## 2024-04-04 PROCEDURE — 36415 COLL VENOUS BLD VENIPUNCTURE: CPT | Performed by: INTERNAL MEDICINE

## 2024-04-04 PROCEDURE — 84439 ASSAY OF FREE THYROXINE: CPT | Performed by: INTERNAL MEDICINE

## 2024-04-04 PROCEDURE — 36415 COLL VENOUS BLD VENIPUNCTURE: CPT

## 2024-04-05 LAB
T4 FREE SERPL-MCNC: 1.97 NG/DL (ref 0.93–1.7)
TSH SERPL DL<=0.05 MIU/L-ACNC: 0.01 UIU/ML (ref 0.27–4.2)

## 2024-08-20 LAB
NCCN CRITERIA FLAG: NORMAL
TYRER CUZICK SCORE: 15.9

## 2024-09-04 ENCOUNTER — HOSPITAL ENCOUNTER (OUTPATIENT)
Dept: MAMMOGRAPHY | Facility: HOSPITAL | Age: 54
Discharge: HOME | End: 2024-09-04
Admitting: NURSE PRACTITIONER
Payer: COMMERCIAL

## 2024-09-04 DIAGNOSIS — R92.8 ABNORMAL MAMMOGRAM: ICD-10-CM

## 2024-09-04 PROCEDURE — 77066 DX MAMMO INCL CAD BI: CPT

## 2024-09-04 PROCEDURE — G0279 TOMOSYNTHESIS, MAMMO: HCPCS

## 2024-09-18 ENCOUNTER — TRANSCRIBE ORDERS (OUTPATIENT)
Dept: ADMINISTRATIVE | Facility: HOSPITAL | Age: 54
End: 2024-09-18
Payer: COMMERCIAL

## 2024-09-18 DIAGNOSIS — Z12.31 VISIT FOR SCREENING MAMMOGRAM: Primary | ICD-10-CM

## 2024-11-11 ENCOUNTER — OFFICE VISIT (OUTPATIENT)
Dept: ENDOCRINOLOGY | Facility: CLINIC | Age: 54
End: 2024-11-11
Payer: COMMERCIAL

## 2024-11-11 VITALS
OXYGEN SATURATION: 99 % | SYSTOLIC BLOOD PRESSURE: 102 MMHG | DIASTOLIC BLOOD PRESSURE: 68 MMHG | BODY MASS INDEX: 20.76 KG/M2 | HEIGHT: 68 IN | WEIGHT: 137 LBS | HEART RATE: 74 BPM

## 2024-11-11 DIAGNOSIS — C73 PAPILLARY THYROID CARCINOMA: ICD-10-CM

## 2024-11-11 DIAGNOSIS — E89.0 POSTSURGICAL HYPOTHYROIDISM: Primary | ICD-10-CM

## 2024-11-11 LAB
T4 FREE SERPL-MCNC: 2.11 NG/DL (ref 0.92–1.68)
TSH SERPL DL<=0.05 MIU/L-ACNC: 0.01 UIU/ML (ref 0.27–4.2)

## 2024-11-11 PROCEDURE — 84439 ASSAY OF FREE THYROXINE: CPT | Performed by: INTERNAL MEDICINE

## 2024-11-11 PROCEDURE — 84432 ASSAY OF THYROGLOBULIN: CPT | Performed by: INTERNAL MEDICINE

## 2024-11-11 PROCEDURE — 84443 ASSAY THYROID STIM HORMONE: CPT | Performed by: INTERNAL MEDICINE

## 2024-11-11 PROCEDURE — 86800 THYROGLOBULIN ANTIBODY: CPT | Performed by: INTERNAL MEDICINE

## 2024-11-11 PROCEDURE — 99213 OFFICE O/P EST LOW 20 MIN: CPT | Performed by: INTERNAL MEDICINE

## 2024-11-11 RX ORDER — LEVOTHYROXINE SODIUM 112 MCG
112 TABLET ORAL DAILY
Qty: 90 TABLET | Refills: 3 | Status: SHIPPED | OUTPATIENT
Start: 2024-11-11 | End: 2024-11-12 | Stop reason: DRUGHIGH

## 2024-11-11 NOTE — ASSESSMENT & PLAN NOTE
Very low risk for recurrence.  We discussed forgoing neck u/s since imaging has been negative.  TG has been very low but not undetectable.  Check TG today.

## 2024-11-11 NOTE — PROGRESS NOTES
Office Note      Date: 2024  Patient Name: Alicia Walton  MRN: [id number]  : 1970    Chief Complaint   Patient presents with    Hypothyroidism       History of Present Illness:   Alicia Walton is a 54 y.o. female who presents for Hypothyroidism    She remains on the synthroid 112mcg qd. She is taking this correctly. She isn't taking any interfering meds concurrently. She hasn't noted any change in the size of her neck. She denies any compressive sxs. She denies any sxs of hypo- or hyperthyroidism at this time.      Papillary thyroid cancer (+ lymph nodes and some esophageal extension)  Total thyroidectomy   100mci I-131 remnant ablation   Lymph node removal / recurrence   150 mci I-131 in    Whole body scan , Thyrogen induced, Negative for metastatic dz.   Whole body scan and TG after thyrogen normal    Whole body scan showed uptake in neck    100mCi I131 3/06 - neck uptake on post-treatment scan; negative TG and thyrogen  stimulated scan 10/06; thyrogen stimulated TG of 1.1 in 10/07; negative thyrogen scan and  TG 10/08; negative scan and TG of 0.7 in 12/10; negative scan and TG 1.0 in ; negative  neck u/s ; negative neck u/s and unstimulated TG ; negative neck u/s and  unstimulated TG 5/15; negative thyrogen stimulated TG ; negative neck u/s ;  negative unstimulated TG and neck u/s ; negative neck u/s and unstimulated TG 2019; negative neck u/s and unstimulated TG of 0.2 in 2021    Subjective      Review of Systems:   Review of Systems   Constitutional: Negative.    Cardiovascular: Negative.    Gastrointestinal: Negative.    Endocrine: Negative.        The following portions of the patient's history were reviewed and updated as appropriate: allergies, current medications, past family history, past medical history, past social history, past surgical history, and problem list.    Objective     Visit Vitals  /68 (BP  "Location: Left arm, Patient Position: Sitting, Cuff Size: Adult)   Pulse 74   Ht 172.7 cm (68\")   Wt 62.1 kg (137 lb)   LMP 11/02/2016 (Exact Date)   SpO2 99%   BMI 20.83 kg/m²       Physical Exam:  Physical Exam  Constitutional:       Appearance: Normal appearance.   Neck:      Comments: No palpable thyroid tissue or neck masses  Lymphadenopathy:      Cervical: No cervical adenopathy.   Neurological:      Mental Status: She is alert.         Labs:    TSH  No results found for: \"TSHBASE\"     Free T4  Free T4   Date Value Ref Range Status   04/04/2024 1.97 (H) 0.93 - 1.70 ng/dL Final       T3  No results found for: \"C0ZWJMA\"      TPO  No results found for: \"THYROIDAB\"    TG AB  No results found for: \"THGAB\"    TG  No results found for: \"THYROGLB\"    CBC w/DIFF  Lab Results   Component Value Date    WBC 6.81 01/31/2014    RBC 4.85 01/31/2014    HGB 14.4 01/31/2014    HCT 42.3 01/31/2014    MCV 87.2 01/31/2014    MCH 29.7 01/31/2014    MCHC 34.0 01/31/2014     01/31/2014           Assessment / Plan      Assessment & Plan:  Diagnoses and all orders for this visit:    1. Postsurgical hypothyroidism (Primary)  Assessment & Plan:  Continue T4 tx.  Check TFTs.      2. Papillary thyroid carcinoma  Assessment & Plan:  Very low risk for recurrence.  We discussed forgoing neck u/s since imaging has been negative.  TG has been very low but not undetectable.  Check TG today.      Other orders  -     Synthroid 112 MCG tablet; Take 1 tablet by mouth Daily.  Dispense: 90 tablet; Refill: 3      Current Outpatient Medications   Medication Instructions    calcium carbonate (OS-ALVERTO) 600 MG tablet Calcium 600 mg calcium (1,500 mg) tablet   1 qd    clobetasol (TEMOVATE) 0.05 % external solution clobetasol 0.05 % scalp solution   USE 1 APPLICATION TO THE AFFECTED AREA(S) ONCE A DAY AS NEEDED    Fiber Adult Gummies 2 g chewable tablet No dose, route, or frequency recorded.    Fluocinolone Acetonide Scalp 0.01 % oil Derma-Smoothe/FS " Scalp Oil 0.01 %   APPLY THIN LAYER TO DAMP SCALP BY TOPICAL ROUTE MASSAGE WELL AND COVER. LEAVE ON FOR 4 HOURS OR OVERNIGHT THEN WASH OFF    fluticasone (FLONASE) 50 MCG/ACT nasal spray Flonase Allergy Relief 50 mcg/actuation nasal spray,suspension   Spray 1 spray every day by intranasal route.    loratadine (CLARITIN) 10 MG tablet loratadine 10 mg tablet   Take 1 tablet every day by oral route.    multivitamin (THERAGRAN) tablet tablet Multiple Vitamin capsule   Daily    nitrofurantoin, macrocrystal-monohydrate, (MACROBID) 100 MG capsule Macrobid 100 mg capsule   1 cap Daily    Synthroid 112 mcg, Oral, Daily      Return in about 1 year (around 11/11/2025) for Recheck with TSH, free T4, TG, TG ab.    Electronically signed by: Abelino Kelley MD  11/11/2024

## 2024-11-12 DIAGNOSIS — E89.0 POSTSURGICAL HYPOTHYROIDISM: Primary | ICD-10-CM

## 2024-11-12 RX ORDER — LEVOTHYROXINE SODIUM 100 MCG
100 TABLET ORAL DAILY
Qty: 90 TABLET | Refills: 3 | Status: SHIPPED | OUTPATIENT
Start: 2024-11-12

## 2024-11-13 ENCOUNTER — PRIOR AUTHORIZATION (OUTPATIENT)
Dept: ENDOCRINOLOGY | Facility: CLINIC | Age: 54
End: 2024-11-13
Payer: COMMERCIAL

## 2024-11-13 LAB
THYROGLOB AB SERPL-ACNC: <1 IU/ML (ref 0–0.9)
THYROGLOB SERPL-MCNC: <0.1 NG/ML (ref 1.5–38.5)

## 2024-11-14 NOTE — TELEPHONE ENCOUNTER
Alicia Hoyos ([place]: [id number])  PA Case ID #: [id number]  Rx #: [id number]  Need Help? Call us at [phone]  Outcome  Approved on November 13 by [employer]  PA Case: [id number], Status: Approved, Coverage Starts on: 11/13/2024 12:00:00 AM, Coverage Ends on: 11/13/2025 12:00:00 AM.  Authorization Expiration Date: 11/12/2025  Drug  Synthroid 100MCG tablets  ePA cloud logo  Form  [employer] Electronic PA Form (2017 [employer])   What Is The Reason For Today's Visit?: History of Melanoma Year Excised?: 2010

## 2025-08-21 LAB
NCCN CRITERIA FLAG: NORMAL
TYRER CUZICK SCORE: 15